# Patient Record
Sex: FEMALE | Race: OTHER | NOT HISPANIC OR LATINO | Employment: FULL TIME | URBAN - METROPOLITAN AREA
[De-identification: names, ages, dates, MRNs, and addresses within clinical notes are randomized per-mention and may not be internally consistent; named-entity substitution may affect disease eponyms.]

---

## 2023-02-20 LAB
ERYTHROCYTE DISTRIBUTION WIDTH (RATIO) BY AUTOMATED COUNT: 18.1 % (ref 11.5–14.5)
ERYTHROCYTE MEAN CORPUSCULAR HEMOGLOBIN CONCENTRATION (G/DL) BY AUTOMATED: 28.3 G/DL (ref 32–36)
ERYTHROCYTE MEAN CORPUSCULAR VOLUME (FL) BY AUTOMATED COUNT: 78 FL (ref 80–100)
ERYTHROCYTES (10*6/UL) IN BLOOD BY AUTOMATED COUNT: 4.46 X10E12/L (ref 4–5.2)
HEMATOCRIT (%) IN BLOOD BY AUTOMATED COUNT: 34.6 % (ref 36–46)
HEMOGLOBIN (G/DL) IN BLOOD: 9.8 G/DL (ref 12–16)
LEUKOCYTES (10*3/UL) IN BLOOD BY AUTOMATED COUNT: 7 X10E9/L (ref 4.4–11.3)
NRBC (PER 100 WBCS) BY AUTOMATED COUNT: 0 /100 WBC (ref 0–0)
PLATELETS (10*3/UL) IN BLOOD AUTOMATED COUNT: 562 X10E9/L (ref 150–450)

## 2023-02-21 LAB
ALANINE AMINOTRANSFERASE (SGPT) (U/L) IN SER/PLAS: 9 U/L (ref 7–45)
ALBUMIN (G/DL) IN SER/PLAS: 3.9 G/DL (ref 3.4–5)
ALKALINE PHOSPHATASE (U/L) IN SER/PLAS: 66 U/L (ref 33–110)
ANION GAP IN SER/PLAS: 13 MMOL/L (ref 10–20)
ASPARTATE AMINOTRANSFERASE (SGOT) (U/L) IN SER/PLAS: 10 U/L (ref 9–39)
BILIRUBIN TOTAL (MG/DL) IN SER/PLAS: 0.4 MG/DL (ref 0–1.2)
CALCIDIOL (25 OH VITAMIN D3) (NG/ML) IN SER/PLAS: 9 NG/ML
CALCIUM (MG/DL) IN SER/PLAS: 8.8 MG/DL (ref 8.6–10.6)
CARBON DIOXIDE, TOTAL (MMOL/L) IN SER/PLAS: 26 MMOL/L (ref 21–32)
CHLORIDE (MMOL/L) IN SER/PLAS: 103 MMOL/L (ref 98–107)
CHOLESTEROL (MG/DL) IN SER/PLAS: 152 MG/DL (ref 0–199)
CHOLESTEROL IN HDL (MG/DL) IN SER/PLAS: 69.4 MG/DL
CHOLESTEROL/HDL RATIO: 2.2
CREATININE (MG/DL) IN SER/PLAS: 0.63 MG/DL (ref 0.5–1.05)
GFR FEMALE: >90 ML/MIN/1.73M2
GLUCOSE (MG/DL) IN SER/PLAS: 91 MG/DL (ref 74–99)
LDL: 71 MG/DL (ref 0–99)
POTASSIUM (MMOL/L) IN SER/PLAS: 4.2 MMOL/L (ref 3.5–5.3)
PROTEIN TOTAL: 7.2 G/DL (ref 6.4–8.2)
SODIUM (MMOL/L) IN SER/PLAS: 138 MMOL/L (ref 136–145)
TRIGLYCERIDE (MG/DL) IN SER/PLAS: 57 MG/DL (ref 0–149)
UREA NITROGEN (MG/DL) IN SER/PLAS: 8 MG/DL (ref 6–23)
VLDL: 11 MG/DL (ref 0–40)

## 2023-05-02 ENCOUNTER — HOSPITAL ENCOUNTER (OUTPATIENT)
Dept: DATA CONVERSION | Facility: HOSPITAL | Age: 43
End: 2023-05-02
Attending: SURGERY | Admitting: SURGERY
Payer: COMMERCIAL

## 2023-05-02 DIAGNOSIS — E66.01 MORBID (SEVERE) OBESITY DUE TO EXCESS CALORIES (MULTI): ICD-10-CM

## 2023-05-02 DIAGNOSIS — K80.10 CALCULUS OF GALLBLADDER WITH CHRONIC CHOLECYSTITIS WITHOUT OBSTRUCTION: ICD-10-CM

## 2023-05-02 DIAGNOSIS — K80.50 CALCULUS OF BILE DUCT WITHOUT CHOLANGITIS OR CHOLECYSTITIS WITHOUT OBSTRUCTION: ICD-10-CM

## 2023-05-02 DIAGNOSIS — K21.9 GASTRO-ESOPHAGEAL REFLUX DISEASE WITHOUT ESOPHAGITIS: ICD-10-CM

## 2023-05-23 LAB
COMPLETE PATHOLOGY REPORT: NORMAL
CONVERTED CLINICAL DIAGNOSIS-HISTORY: NORMAL
CONVERTED FINAL DIAGNOSIS: NORMAL
CONVERTED FINAL REPORT PDF LINK TO COPY AND PASTE: NORMAL
CONVERTED GROSS DESCRIPTION: NORMAL

## 2023-06-28 LAB
CHLAMYDIA TRACH., AMPLIFIED: NEGATIVE
N. GONORRHEA, AMPLIFIED: NEGATIVE
TRICHOMONAS VAGINALIS: NEGATIVE

## 2023-09-14 NOTE — H&P
History & Physical Reviewed:   Pregnant/Lactating:  ·  Are You Pregnant no   ·  Are You Currently Breastfeeding no     I have reviewed the History and Physical dated:  20-Apr-2023   History and Physical reviewed and relevant findings noted. Patient examined to review pertinent physical  findings.: No significant changes   Home Medications Reviewed: no changes noted   Allergies Reviewed: no changes noted     Consent:   COVID-19 Consent:  ·  COVID-19 Risk Consent Surgeon has reviewed key risks related to the risk of royce COVID-19 and if they contract COVID-19 what the risks are.       Electronic Signatures:  Vladimir Parsons)  (Signed 02-May-2023 09:10)   Authored: History & Physical Reviewed, Consent, Note  Completion      Last Updated: 02-May-2023 09:10 by Vladimir Parsons)

## 2023-10-02 NOTE — OP NOTE
"      PROCEDURE DETAILS    Preoperative Diagnosis:  Cholelithiasis    Postoperative Diagnosis:  Cholelithiasis, chronic cholecystitis     Surgeon: Vladimir Parsons  Resident/Fellow/Other Assistant: Amelia Walls    Procedure:  1.  laparoscopic cholecystectomy      Anesthesia: General  Estimated Blood Loss: 5  Findings: signs of  acute inflammation  Patient Returned To/Condition: Stable to PACU         Operative Report:    Patient is a 42-year-old female with history of morbid obesity who is long known to  have gallstones. Since September 2022 she has had repeated attacks of some epigastric abdominal pain that radiates to the right sided back. Is associated with nausea and vomiting. Her symptoms are often made worse with meals. She did seek treatment in  emergency department where CT scan shows her to have a 2.4 cm gallstone lodged in the neck of the gallbladder.  She comes in for elective  laparoscopic cholecystectomy. We discussed the procedure to include risks, benefits and alternatives. She agrees  to the operation       DESCRIPTION OF PROCEDURE:    The patient was taken to the operating room, placed supine on the operating table.  Time-out was performed.  General anesthesia was induced.  She received antibiotics.  The abdomen was prepped and draped in a sterile fashion.  We made an infraumbilical  midline incision, placed a 12 mm Veena trocar.  We established insufflation. We placed a 5 mm subxiphoid port and then two 5 mm right subcostal ports.  We retracted the gallbladder up and over the right lobe of the liver.  There were attachments of the  omentum to the gallbladder suggesting chronic inflammation, these were taken down.  We then retracted the infundibulum laterally and inferiorly, and this nicely exposed the triangle of Calot.  We circumferentially dissected the cystic duct and cystic  artery free from surrounding structures,  exposing the cystic duct and cystic artery thus achieving the \"critical view.\"  " We doubly ligated the cystic artery using Hem-o-julio cesar clips and divided it. We then doubly ligated the cystic duct and divided it.   We then excised the gallbladder from the inferior aspect of the liver using the cautery.  Once excised, we removed the gallbladder specimen using an Endo Catch bag.  We re-established insufflation and after ensuring hemostasis, we removed our ports,  closed our midline fascial defect using 0 Vicryl suture placed in a figure of eight fashion.  We then closed the skin using 3-0 and 4-0 subcuticular Vicryl stitches followed by Dermabond glue.  The patient tolerated the procedure without difficulty and  was returned to the recovery room in stable condition.    Vladimir Parsons MD   Note Recipients:   Ernesto Torres MD Jasper, John, MD                          Electronic Signatures:  Vladimir Parsons)  (Signed 02-May-2023 11:00)   Authored: Post-Operative Note, Chart Review, Note Completion      Last Updated: 02-May-2023 11:00 by Vladimir Parsons)

## 2023-11-28 ENCOUNTER — OFFICE VISIT (OUTPATIENT)
Dept: PRIMARY CARE | Facility: CLINIC | Age: 43
End: 2023-11-28
Payer: COMMERCIAL

## 2023-11-28 VITALS
BODY MASS INDEX: 43.4 KG/M2 | HEART RATE: 97 BPM | SYSTOLIC BLOOD PRESSURE: 132 MMHG | WEIGHT: 293 LBS | HEIGHT: 69 IN | RESPIRATION RATE: 18 BRPM | DIASTOLIC BLOOD PRESSURE: 88 MMHG | TEMPERATURE: 98 F | OXYGEN SATURATION: 98 %

## 2023-11-28 DIAGNOSIS — D50.0 IRON DEFICIENCY ANEMIA DUE TO CHRONIC BLOOD LOSS: Primary | ICD-10-CM

## 2023-11-28 DIAGNOSIS — N92.0 MENORRHAGIA WITH REGULAR CYCLE: ICD-10-CM

## 2023-11-28 DIAGNOSIS — Z00.00 HEALTHCARE MAINTENANCE: ICD-10-CM

## 2023-11-28 DIAGNOSIS — G89.29 CHRONIC BACK PAIN, UNSPECIFIED BACK LOCATION, UNSPECIFIED BACK PAIN LATERALITY: ICD-10-CM

## 2023-11-28 DIAGNOSIS — M54.9 CHRONIC BACK PAIN, UNSPECIFIED BACK LOCATION, UNSPECIFIED BACK PAIN LATERALITY: ICD-10-CM

## 2023-11-28 LAB
ALBUMIN SERPL BCP-MCNC: 4.1 G/DL (ref 3.4–5)
ALP SERPL-CCNC: 69 U/L (ref 33–110)
ALT SERPL W P-5'-P-CCNC: 9 U/L (ref 7–45)
ANION GAP SERPL CALC-SCNC: 15 MMOL/L (ref 10–20)
AST SERPL W P-5'-P-CCNC: 9 U/L (ref 9–39)
BILIRUB SERPL-MCNC: 0.3 MG/DL (ref 0–1.2)
BUN SERPL-MCNC: 11 MG/DL (ref 6–23)
CALCIUM SERPL-MCNC: 9 MG/DL (ref 8.6–10.6)
CHLORIDE SERPL-SCNC: 104 MMOL/L (ref 98–107)
CHOLEST SERPL-MCNC: 146 MG/DL (ref 0–199)
CHOLESTEROL/HDL RATIO: 2.4
CO2 SERPL-SCNC: 23 MMOL/L (ref 21–32)
CREAT SERPL-MCNC: 0.75 MG/DL (ref 0.5–1.05)
ERYTHROCYTE [DISTWIDTH] IN BLOOD BY AUTOMATED COUNT: 17.9 % (ref 11.5–14.5)
GFR SERPL CREATININE-BSD FRML MDRD: >90 ML/MIN/1.73M*2
GLUCOSE SERPL-MCNC: 119 MG/DL (ref 74–99)
HCT VFR BLD AUTO: 36.2 % (ref 36–46)
HDLC SERPL-MCNC: 62.1 MG/DL
HGB BLD-MCNC: 10.5 G/DL (ref 12–16)
MCH RBC QN AUTO: 22.7 PG (ref 26–34)
MCHC RBC AUTO-ENTMCNC: 29 G/DL (ref 32–36)
MCV RBC AUTO: 78 FL (ref 80–100)
NON-HDL CHOLESTEROL: 84 MG/DL (ref 0–149)
NRBC BLD-RTO: 0 /100 WBCS (ref 0–0)
PLATELET # BLD AUTO: 548 X10*3/UL (ref 150–450)
POTASSIUM SERPL-SCNC: 4 MMOL/L (ref 3.5–5.3)
PROT SERPL-MCNC: 7 G/DL (ref 6.4–8.2)
RBC # BLD AUTO: 4.63 X10*6/UL (ref 4–5.2)
SODIUM SERPL-SCNC: 138 MMOL/L (ref 136–145)
WBC # BLD AUTO: 6.6 X10*3/UL (ref 4.4–11.3)

## 2023-11-28 PROCEDURE — 1036F TOBACCO NON-USER: CPT | Performed by: INTERNAL MEDICINE

## 2023-11-28 PROCEDURE — 85027 COMPLETE CBC AUTOMATED: CPT

## 2023-11-28 PROCEDURE — 36415 COLL VENOUS BLD VENIPUNCTURE: CPT

## 2023-11-28 PROCEDURE — 82465 ASSAY BLD/SERUM CHOLESTEROL: CPT

## 2023-11-28 PROCEDURE — 3008F BODY MASS INDEX DOCD: CPT | Performed by: INTERNAL MEDICINE

## 2023-11-28 PROCEDURE — 83718 ASSAY OF LIPOPROTEIN: CPT

## 2023-11-28 PROCEDURE — 80053 COMPREHEN METABOLIC PANEL: CPT

## 2023-11-28 PROCEDURE — 99213 OFFICE O/P EST LOW 20 MIN: CPT | Performed by: INTERNAL MEDICINE

## 2023-11-28 SDOH — ECONOMIC STABILITY: TRANSPORTATION INSECURITY
IN THE PAST 12 MONTHS, HAS LACK OF TRANSPORTATION KEPT YOU FROM MEETINGS, WORK, OR FROM GETTING THINGS NEEDED FOR DAILY LIVING?: NO

## 2023-11-28 SDOH — ECONOMIC STABILITY: HOUSING INSECURITY
IN THE LAST 12 MONTHS, WAS THERE A TIME WHEN YOU DID NOT HAVE A STEADY PLACE TO SLEEP OR SLEPT IN A SHELTER (INCLUDING NOW)?: NO

## 2023-11-28 SDOH — ECONOMIC STABILITY: TRANSPORTATION INSECURITY
IN THE PAST 12 MONTHS, HAS THE LACK OF TRANSPORTATION KEPT YOU FROM MEDICAL APPOINTMENTS OR FROM GETTING MEDICATIONS?: NO

## 2023-11-28 SDOH — ECONOMIC STABILITY: INCOME INSECURITY: IN THE LAST 12 MONTHS, WAS THERE A TIME WHEN YOU WERE NOT ABLE TO PAY THE MORTGAGE OR RENT ON TIME?: NO

## 2023-11-28 SDOH — ECONOMIC STABILITY: FOOD INSECURITY: WITHIN THE PAST 12 MONTHS, YOU WORRIED THAT YOUR FOOD WOULD RUN OUT BEFORE YOU GOT MONEY TO BUY MORE.: NEVER TRUE

## 2023-11-28 SDOH — ECONOMIC STABILITY: FOOD INSECURITY: WITHIN THE PAST 12 MONTHS, THE FOOD YOU BOUGHT JUST DIDN'T LAST AND YOU DIDN'T HAVE MONEY TO GET MORE.: NEVER TRUE

## 2023-11-28 SDOH — HEALTH STABILITY: PHYSICAL HEALTH: ON AVERAGE, HOW MANY DAYS PER WEEK DO YOU ENGAGE IN MODERATE TO STRENUOUS EXERCISE (LIKE A BRISK WALK)?: 6 DAYS

## 2023-11-28 SDOH — ECONOMIC STABILITY: GENERAL
WHICH OF THE FOLLOWING DO YOU KNOW HOW TO USE AND HAVE ACCESS TO EVERY DAY? (CHOOSE ALL THAT APPLY): DESKTOP COMPUTER, LAPTOP COMPUTER, OR TABLET WITH BROADBAND INTERNET CONNECTION;SMARTPHONE WITH CELLULAR DATA PLAN

## 2023-11-28 SDOH — HEALTH STABILITY: PHYSICAL HEALTH: ON AVERAGE, HOW MANY MINUTES DO YOU ENGAGE IN EXERCISE AT THIS LEVEL?: 60 MIN

## 2023-11-28 ASSESSMENT — ANXIETY QUESTIONNAIRES
IF YOU CHECKED OFF ANY PROBLEMS ON THIS QUESTIONNAIRE, HOW DIFFICULT HAVE THESE PROBLEMS MADE IT FOR YOU TO DO YOUR WORK, TAKE CARE OF THINGS AT HOME, OR GET ALONG WITH OTHER PEOPLE: NOT DIFFICULT AT ALL
2. NOT BEING ABLE TO STOP OR CONTROL WORRYING: NOT AT ALL
5. BEING SO RESTLESS THAT IT IS HARD TO SIT STILL: NOT AT ALL
GAD7 TOTAL SCORE: 0
1. FEELING NERVOUS, ANXIOUS, OR ON EDGE: NOT AT ALL
6. BECOMING EASILY ANNOYED OR IRRITABLE: NOT AT ALL
4. TROUBLE RELAXING: NOT AT ALL
3. WORRYING TOO MUCH ABOUT DIFFERENT THINGS: NOT AT ALL
7. FEELING AFRAID AS IF SOMETHING AWFUL MIGHT HAPPEN: NOT AT ALL

## 2023-11-28 ASSESSMENT — LIFESTYLE VARIABLES
SKIP TO QUESTIONS 9-10: 1
HOW MANY STANDARD DRINKS CONTAINING ALCOHOL DO YOU HAVE ON A TYPICAL DAY: 1 OR 2
HOW OFTEN DO YOU HAVE A DRINK CONTAINING ALCOHOL: MONTHLY OR LESS
AUDIT-C TOTAL SCORE: 1
HOW OFTEN DO YOU HAVE SIX OR MORE DRINKS ON ONE OCCASION: NEVER

## 2023-11-28 ASSESSMENT — ENCOUNTER SYMPTOMS
PALPITATIONS: 0
SHORTNESS OF BREATH: 0
CHEST TIGHTNESS: 0
AGITATION: 0
DIARRHEA: 0
LOSS OF SENSATION IN FEET: 0
CHILLS: 0
OCCASIONAL FEELINGS OF UNSTEADINESS: 0
DEPRESSION: 0
SORE THROAT: 0
NAUSEA: 0
ACTIVITY CHANGE: 0
SINUS PRESSURE: 0
MYALGIAS: 0
WEAKNESS: 0

## 2023-11-28 ASSESSMENT — COLUMBIA-SUICIDE SEVERITY RATING SCALE - C-SSRS
2. HAVE YOU ACTUALLY HAD ANY THOUGHTS OF KILLING YOURSELF?: NO
1. IN THE PAST MONTH, HAVE YOU WISHED YOU WERE DEAD OR WISHED YOU COULD GO TO SLEEP AND NOT WAKE UP?: NO
6. HAVE YOU EVER DONE ANYTHING, STARTED TO DO ANYTHING, OR PREPARED TO DO ANYTHING TO END YOUR LIFE?: NO

## 2023-11-28 ASSESSMENT — PATIENT HEALTH QUESTIONNAIRE - PHQ9
1. LITTLE INTEREST OR PLEASURE IN DOING THINGS: NOT AT ALL
2. FEELING DOWN, DEPRESSED OR HOPELESS: NOT AT ALL
SUM OF ALL RESPONSES TO PHQ9 QUESTIONS 1 & 2: 0

## 2023-11-28 ASSESSMENT — SOCIAL DETERMINANTS OF HEALTH (SDOH)
HOW HARD IS IT FOR YOU TO PAY FOR THE VERY BASICS LIKE FOOD, HOUSING, MEDICAL CARE, AND HEATING?: NOT HARD AT ALL
WITHIN THE LAST YEAR, HAVE TO BEEN RAPED OR FORCED TO HAVE ANY KIND OF SEXUAL ACTIVITY BY YOUR PARTNER OR EX-PARTNER?: NO
WITHIN THE LAST YEAR, HAVE YOU BEEN HUMILIATED OR EMOTIONALLY ABUSED IN OTHER WAYS BY YOUR PARTNER OR EX-PARTNER?: NO
IN A TYPICAL WEEK, HOW MANY TIMES DO YOU TALK ON THE PHONE WITH FAMILY, FRIENDS, OR NEIGHBORS?: MORE THAN THREE TIMES A WEEK
IN THE PAST 12 MONTHS, HAS THE ELECTRIC, GAS, OIL, OR WATER COMPANY THREATENED TO SHUT OFF SERVICE IN YOUR HOME?: NO
WITHIN THE LAST YEAR, HAVE YOU BEEN AFRAID OF YOUR PARTNER OR EX-PARTNER?: NO
HOW OFTEN DO YOU GET TOGETHER WITH FRIENDS OR RELATIVES?: MORE THAN THREE TIMES A WEEK
WITHIN THE LAST YEAR, HAVE YOU BEEN KICKED, HIT, SLAPPED, OR OTHERWISE PHYSICALLY HURT BY YOUR PARTNER OR EX-PARTNER?: NO
HOW OFTEN DO YOU ATTENT MEETINGS OF THE CLUB OR ORGANIZATION YOU BELONG TO?: PATIENT DECLINED
DO YOU BELONG TO ANY CLUBS OR ORGANIZATIONS SUCH AS CHURCH GROUPS UNIONS, FRATERNAL OR ATHLETIC GROUPS, OR SCHOOL GROUPS?: PATIENT DECLINED
ARE YOU MARRIED, WIDOWED, DIVORCED, SEPARATED, NEVER MARRIED, OR LIVING WITH A PARTNER?: PATIENT DECLINED
HOW OFTEN DO YOU ATTEND CHURCH OR RELIGIOUS SERVICES?: MORE THAN 4 TIMES PER YEAR

## 2023-11-28 ASSESSMENT — PAIN SCALES - GENERAL: PAINLEVEL: 0-NO PAIN

## 2023-11-28 NOTE — PROGRESS NOTES
"Subjective   Patient ID: Viktoria Rodriguez is a 42 y.o. female who presents for referral/ breast reduction. She has wanted one since age 19.  She has back problems with frequent exacerbations at least monthly.  Also with shoulder pain with deformities of both shoulders. She is also wondering about taking ozempic in January 2024 (she called and they bsaid it would be covered).    HPI     Review of Systems   Constitutional:  Negative for activity change and chills.   HENT:  Negative for congestion, sinus pressure and sore throat.    Respiratory:  Negative for chest tightness and shortness of breath.    Cardiovascular:  Negative for chest pain and palpitations.   Gastrointestinal:  Negative for diarrhea and nausea.   Musculoskeletal:  Negative for myalgias.   Neurological:  Negative for weakness.   Psychiatric/Behavioral:  Negative for agitation and behavioral problems.        Objective   /88 (BP Location: Left arm, Patient Position: Sitting, BP Cuff Size: Large adult)   Pulse 97   Temp 36.7 °C (98 °F) (Temporal)   Resp 18   Ht 1.74 m (5' 8.5\")   Wt (!) 155 kg (341 lb)   SpO2 98%   BMI 51.09 kg/m²     Physical Exam  Constitutional:       Appearance: Normal appearance.   HENT:      Head: Normocephalic and atraumatic.      Nose: Nose normal.      Mouth/Throat:      Mouth: Mucous membranes are moist.   Eyes:      Extraocular Movements: Extraocular movements intact.      Pupils: Pupils are equal, round, and reactive to light.   Cardiovascular:      Rate and Rhythm: Normal rate and regular rhythm.   Pulmonary:      Effort: No respiratory distress.      Breath sounds: No wheezing.   Abdominal:      General: Bowel sounds are normal.      Palpations: Abdomen is soft.   Neurological:      General: No focal deficit present.         Assessment/Plan   Problem List Items Addressed This Visit             ICD-10-CM    Menorrhagia with regular cycle N92.0    Relevant Orders    CBC    Chronic back pain M54.9, G89.29    " Relevant Orders    Referral to Breast Surgery    Iron deficiency anemia due to chronic blood loss - Primary D50.0    Relevant Orders    CBC     Other Visit Diagnoses         Codes    Healthcare maintenance     Z00.00    Relevant Orders    Comprehensive Metabolic Panel    Lipid Panel Non-Fasting        I will refer patient to breast surgeon for evaluation for a reduction.  We discussed her need to take iron and vitamin D as prescribed in the past, and she will resume taking that.  Will check screening labs.

## 2024-02-28 PROBLEM — R11.2 NAUSEA & VOMITING: Status: ACTIVE | Noted: 2024-02-28

## 2024-02-28 PROBLEM — E55.9 HYPOVITAMINOSIS D: Status: ACTIVE | Noted: 2024-02-28

## 2024-02-28 PROBLEM — D50.9 IRON DEFICIENCY ANEMIA: Status: ACTIVE | Noted: 2020-03-19

## 2024-02-28 PROBLEM — N94.6 DYSMENORRHEA: Status: ACTIVE | Noted: 2020-05-18

## 2024-02-28 PROBLEM — D50.0 ANEMIA DUE TO BLOOD LOSS: Status: ACTIVE | Noted: 2024-02-28

## 2024-02-28 PROBLEM — R50.9 FEVER: Status: ACTIVE | Noted: 2024-02-28

## 2024-02-28 PROBLEM — R10.9 ABDOMINAL DISCOMFORT: Status: ACTIVE | Noted: 2024-02-28

## 2024-02-28 PROBLEM — E66.01 MORBID OBESITY (MULTI): Chronic | Status: ACTIVE | Noted: 2020-03-18

## 2024-02-28 PROBLEM — R05.9 COUGH: Status: ACTIVE | Noted: 2024-02-28

## 2024-02-28 PROBLEM — K80.50 BILIARY COLIC: Status: ACTIVE | Noted: 2024-02-28

## 2024-02-28 PROBLEM — R41.840 ATTENTION DEFICIT: Status: ACTIVE | Noted: 2024-02-28

## 2024-02-28 PROBLEM — N93.9 ABNORMAL UTERINE BLEEDING (AUB): Status: ACTIVE | Noted: 2024-02-28

## 2024-02-28 PROBLEM — R73.03 PREDIABETES: Status: ACTIVE | Noted: 2020-03-19

## 2024-02-28 PROBLEM — K80.50 GALL BLADDER PAIN: Status: ACTIVE | Noted: 2024-02-28

## 2024-02-28 PROBLEM — K80.20 CHOLELITHIASIS WITHOUT OBSTRUCTION: Status: ACTIVE | Noted: 2024-02-28

## 2024-02-29 ENCOUNTER — OFFICE VISIT (OUTPATIENT)
Dept: PRIMARY CARE | Facility: CLINIC | Age: 44
End: 2024-02-29
Payer: COMMERCIAL

## 2024-02-29 VITALS
DIASTOLIC BLOOD PRESSURE: 86 MMHG | RESPIRATION RATE: 18 BRPM | SYSTOLIC BLOOD PRESSURE: 136 MMHG | WEIGHT: 293 LBS | HEART RATE: 90 BPM | TEMPERATURE: 97.6 F | HEIGHT: 69 IN | BODY MASS INDEX: 43.4 KG/M2 | OXYGEN SATURATION: 98 %

## 2024-02-29 DIAGNOSIS — F51.01 PRIMARY INSOMNIA: ICD-10-CM

## 2024-02-29 DIAGNOSIS — E55.9 HYPOVITAMINOSIS D: ICD-10-CM

## 2024-02-29 DIAGNOSIS — M54.50 CHRONIC MIDLINE LOW BACK PAIN WITHOUT SCIATICA: Primary | ICD-10-CM

## 2024-02-29 DIAGNOSIS — Z11.4 SCREENING FOR HIV (HUMAN IMMUNODEFICIENCY VIRUS): ICD-10-CM

## 2024-02-29 DIAGNOSIS — L91.8 SKIN TAG: ICD-10-CM

## 2024-02-29 DIAGNOSIS — Z11.59 NEED FOR HEPATITIS C SCREENING TEST: ICD-10-CM

## 2024-02-29 DIAGNOSIS — G89.29 CHRONIC MIDLINE LOW BACK PAIN WITHOUT SCIATICA: Primary | ICD-10-CM

## 2024-02-29 DIAGNOSIS — N92.0 MENORRHAGIA WITH REGULAR CYCLE: ICD-10-CM

## 2024-02-29 DIAGNOSIS — E66.01 MORBID OBESITY (MULTI): Chronic | ICD-10-CM

## 2024-02-29 DIAGNOSIS — Z23 NEED FOR VACCINATION: ICD-10-CM

## 2024-02-29 LAB
25(OH)D3 SERPL-MCNC: 20 NG/ML (ref 30–100)
EST. AVERAGE GLUCOSE BLD GHB EST-MCNC: 120 MG/DL
HBA1C MFR BLD: 5.8 %
HCV AB SER QL: NONREACTIVE
HIV 1+2 AB+HIV1 P24 AG SERPL QL IA: NONREACTIVE

## 2024-02-29 PROCEDURE — 99396 PREV VISIT EST AGE 40-64: CPT | Performed by: INTERNAL MEDICINE

## 2024-02-29 PROCEDURE — 83036 HEMOGLOBIN GLYCOSYLATED A1C: CPT

## 2024-02-29 PROCEDURE — 1036F TOBACCO NON-USER: CPT | Performed by: INTERNAL MEDICINE

## 2024-02-29 PROCEDURE — 87389 HIV-1 AG W/HIV-1&-2 AB AG IA: CPT

## 2024-02-29 PROCEDURE — 36415 COLL VENOUS BLD VENIPUNCTURE: CPT

## 2024-02-29 PROCEDURE — 86803 HEPATITIS C AB TEST: CPT

## 2024-02-29 PROCEDURE — 99213 OFFICE O/P EST LOW 20 MIN: CPT | Performed by: INTERNAL MEDICINE

## 2024-02-29 PROCEDURE — 3008F BODY MASS INDEX DOCD: CPT | Performed by: INTERNAL MEDICINE

## 2024-02-29 PROCEDURE — 82306 VITAMIN D 25 HYDROXY: CPT

## 2024-02-29 RX ORDER — CYCLOBENZAPRINE HCL 10 MG
10 TABLET ORAL 2 TIMES DAILY PRN
COMMUNITY
Start: 2024-01-08 | End: 2024-02-29 | Stop reason: WASHOUT

## 2024-02-29 ASSESSMENT — ENCOUNTER SYMPTOMS
PALPITATIONS: 0
SHORTNESS OF BREATH: 0
ACTIVITY CHANGE: 0
DIARRHEA: 0
CHEST TIGHTNESS: 0
BACK PAIN: 1
SORE THROAT: 0
WEAKNESS: 0
MYALGIAS: 0
NAUSEA: 0
CHILLS: 0
SINUS PRESSURE: 0
AGITATION: 0

## 2024-02-29 ASSESSMENT — PAIN SCALES - GENERAL: PAINLEVEL: 7

## 2024-02-29 NOTE — PROGRESS NOTES
Primary care office Note- Dr. Ernesto Torres      Name: Viktoria Rodriguez, Age: 43 y.o., Gender: female, MRN: [id number]   Pharmacy:   [id number] [id number]  [address] [facility] [address]  Phone: [phone] Fax: [phone]     PCP: Ernesto Torres        Subjective:     Chief Complaint   Patient presents with    Annual Exam       HPI:   Viktoria Rodriguez is a 43 y.o. female that presents for annual exam.  Her family is good. Lives at home with [facility]. 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  Also with a large skin tag on the right side of the back of her thigh.    ROS  Review of Systems   Constitutional:  Negative for activity change and chills.   HENT:  Negative for congestion, sinus pressure and sore throat.    Respiratory:  Negative for chest tightness and shortness of breath.    Cardiovascular:  Negative for chest pain and palpitations.   Gastrointestinal:  Negative for diarrhea and nausea.   Musculoskeletal:  Positive for back pain. Negative for myalgias.   Neurological:  Negative for weakness.   Psychiatric/Behavioral:  Negative for agitation and behavioral problems.         Medical History      PMH:    has no past medical history on file.   Allergies:   Allergies   Allergen Reactions    Cabbage Itching and Swelling      Surgical Hx:   History reviewed. No pertinent surgical history.   Social HX:   Social History     Tobacco Use    Smoking status: Never    Smokeless tobacco: Never   Vaping Use    Vaping Use: Never used   Substance Use Topics    Alcohol use: Yes     Alcohol/week: 4.0 standard drinks of alcohol     Types: 4 Glasses of wine per week    Drug use: Yes     Types: Marijuana        MEDS:   Current Outpatient Medications   Medication Instructions    tetanus-diphtheria toxoids-Td (Tenivac 2-2) injection 0.5 mL, intramuscular, Once        Objective Data     Objective:   Visit Vitals  /86 (BP Location: Left arm, Patient Position: Sitting, BP Cuff Size:  Large adult)   Pulse 90   Temp 36.4 °C (97.6 °F) (Temporal)   Resp 18        Physical Examination:   Physical Exam  Constitutional:       Appearance: Normal appearance.   HENT:      Head: Normocephalic and atraumatic.      Nose: Nose normal.      Mouth/Throat:      Mouth: Mucous membranes are moist.   Eyes:      Extraocular Movements: Extraocular movements intact.      Pupils: Pupils are equal, round, and reactive to light.   Cardiovascular:      Rate and Rhythm: Normal rate and regular rhythm.   Pulmonary:      Effort: No respiratory distress.      Breath sounds: No wheezing.   Abdominal:      General: Bowel sounds are normal.      Palpations: Abdomen is soft.   Skin:     Comments: Large 2.5cm skin tag that is benign in character   Neurological:      General: No focal deficit present.          Last Labs:   CBC:   WBC   Date Value Ref Range Status   11/28/2023 6.6 4.4 - 11.3 x10*3/uL Final   02/20/2023 7.0 4.4 - 11.3 x10E9/L Final   09/01/2022 10.4 4.5 - 11.0 K/UL Final     Hemoglobin   Date Value Ref Range Status   11/28/2023 10.5 (L) 12.0 - 16.0 g/dL Final   02/20/2023 9.8 (L) 12.0 - 16.0 g/dL Final   09/01/2022 10.5 (L) 12.0 - 15.0 GM/DL Final     MCV   Date Value Ref Range Status   11/28/2023 78 (L) 80 - 100 fL Final   02/20/2023 78 (L) 80 - 100 fL Final   09/01/2022 75.8 (L) 80 - 100 FL Final     Platelets   Date Value Ref Range Status   11/28/2023 548 (H) 150 - 450 x10*3/uL Final   02/20/2023 562 (H) 150 - 450 x10E9/L Final   09/01/2022 538 (H) 150 - 450 K/UL Final      CMP:   Sodium   Date Value Ref Range Status   11/28/2023 138 136 - 145 mmol/L Final   02/20/2023 138 136 - 145 mmol/L Final   09/01/2022 135 133 - 145 MMOL/L Final     Potassium   Date Value Ref Range Status   11/28/2023 4.0 3.5 - 5.3 mmol/L Final   02/20/2023 4.2 3.5 - 5.3 mmol/L Final   09/01/2022 4.0 3.4 - 5.1 MMOL/L Final     Chloride   Date Value Ref Range Status   11/28/2023 104 98 - 107 mmol/L Final   02/20/2023 103 98 - 107 mmol/L Final  "  09/01/2022 100 97 - 107 MMOL/L Final     Urea Nitrogen   Date Value Ref Range Status   11/28/2023 11 6 - 23 mg/dL Final   02/20/2023 8 6 - 23 mg/dL Final   09/01/2022 7 (L) 8 - 25 MG/DL Final     Creatinine   Date Value Ref Range Status   11/28/2023 0.75 0.50 - 1.05 mg/dL Final   02/20/2023 0.63 0.50 - 1.05 mg/dL Final   09/01/2022 0.7 0.4 - 1.6 MG/DL Final     eGFR   Date Value Ref Range Status   11/28/2023 >90 >60 mL/min/1.73m*2 Final     Comment:     Calculations of estimated GFR are performed using the 2021 CKD-EPI Study Refit equation without the race variable for the IDMS-Traceable creatinine methods.  https://jasn.asnjournals.org/content/early/2021/09/22/ASN.8834697303     ESTIMATED GFR   Date Value Ref Range Status   09/01/2022 112 mL/min/1.73 m2 Final     Comment:     CALCULATIONS OF ESTIMATED GFR ARE PERFORMED USING THE 2021 CKD-EPI   STUDY REFIT EQUATION WITHOUT THE RACE VARIABLE FOR THE IDMS-TRACEABLE   CREATININE METHODS.  https://jasn.asnjournals.org/content/early/2021/09/22/ASN.9960845401  Performed at [address] [address]     12/21/2021 85 mL/min/1.73 m2 Final     Comment:     GFR ml/min/1.73m2   Stage  ------------------   -----     90               1     60-89            2     30-59            3     15-29            4     <15              5  For -Americans, multiply EGFR result by 1.210  Calculation not validated for patients under 18 years of age.  Performed at [address] [address]        A1c:   No results found for: \"HGBA1C\"     Other labs;   Vit D:   Vitamin D, 25-Hydroxy   Date Value Ref Range Status   02/20/2023 9 (A) ng/mL Final     Comment:     .  DEFICIENCY:         < 20   NG/ML  INSUFFICIENCY:      20-29  NG/ML  SUFFICIENCY:         NG/ML    THIS ASSAY ACCURATELY QUANTIFIES THE SUM OF  VITAMIN D3, 25-HYDROXY AND VIT D2,25-HYDROXY.        TSH:  No results found for: \"TSH\"     Assessment and Plan     Problem List Items Addressed " This Visit       Menorrhagia with regular cycle    Relevant Orders    CBC    Chronic back pain - Primary    Relevant Orders    Referral to Plastic Surgery    Referral to Physical Therapy    Hypovitaminosis D    Relevant Orders    Vitamin D 25-Hydroxy,Total (for eval of Vitamin D levels)    Morbid obesity (CMS/HCC) (Chronic)    Relevant Orders    Hemoglobin A1C    Referral to Nutrition Services     Other Visit Diagnoses       Primary insomnia        Screening for HIV (human immunodeficiency virus)        Relevant Orders    HIV 1/2 Antigen/Antibody Screen with Reflex to Confirmation    Need for hepatitis C screening test        Relevant Orders    Hepatitis C Antibody    Skin tag        Relevant Orders    Referral to Dermatology    Need for vaccination        Relevant Medications    tetanus-diphtheria toxoids-Td (Tenivac 2-2) injection        I will refer her to dermatology for evaluation and removal of that large skin tag.  We will also refer her to nutrition services in preparation for her to see weight management.  In an attempt to address her chronic back pain, she agrees to physical therapy.  Otherwise, we will address her care gaps.    Ernesto Torres MD

## 2024-03-05 ENCOUNTER — APPOINTMENT (OUTPATIENT)
Dept: PRIMARY CARE | Facility: CLINIC | Age: 44
End: 2024-03-05
Payer: COMMERCIAL

## 2024-03-20 ENCOUNTER — NUTRITION (OUTPATIENT)
Dept: NUTRITION | Facility: HOSPITAL | Age: 44
End: 2024-03-20
Payer: COMMERCIAL

## 2024-03-20 VITALS — BODY MASS INDEX: 43.4 KG/M2 | HEIGHT: 69 IN | WEIGHT: 293 LBS

## 2024-03-20 DIAGNOSIS — E66.01 MORBID OBESITY (MULTI): Chronic | ICD-10-CM

## 2024-03-20 PROCEDURE — 97802 MEDICAL NUTRITION INDIV IN: CPT | Performed by: INTERNAL MEDICINE

## 2024-03-20 NOTE — PATIENT INSTRUCTIONS
1) Eat 3 consistent meals per day with 1-2 snacks in between, aim for 1800 kcals   2) Make sure protein is included in every meal and snack   3) Increase water intake to 64 ounces   4) Limit added sugars to less than 25g per day, decrease soda consumption   5) Use Plate method to build meals: 1/2 nonstarchy vegetables, 1/4 starch, 1/4 protein   6) Increase physical activity   7) Try to pack lunched more often, plan meals for the week

## 2024-03-20 NOTE — LETTER
"March 20, 2024     Ernesto Torres MD  [id number]  [facility] [address]    Patient: Viktoria Rodriguez   YOB: 1980   Date of Visit: 3/20/2024       Dear Dr. Ernesto Torres MD:    Thank you for referring Viktoria Rodriguez to me for evaluation. Below are my notes for this consultation.  If you have questions, please do not hesitate to call me. I look forward to following your patient along with you.       Sincerely,     Jeanne Samuels RD, LD      CC: No Recipients  ______________________________________________________________________________________    Nutrition Assessment    Reason for Visit:  Viktoria Rodriguez is a 43 y.o. female who presents for weight reduction.     Anthropometrics:  Anthropometrics  Height: 174 cm (5' 8.5\")  Weight: (!) 153 kg (338 lb)  BMI (Calculated): 50.64  IBW/kg (Dietitian Calculated): 65.9 kg  Percent of IBW: 233 %         Food And Nutrient Intake:  Food and Nutrient History  Food and Nutrient History: Grew up athletic, recently tried semaglutide medication and lost 15 lbs. Potentially dealing with PCOS and would like to lose weight. 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 Stops at fast food due to convenience and potentially packs a lunch 1-2 times a week.  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 packs a lunch maybe 1-2 a week       Micronutrient Intake  Vitamin Intake: D  Mineral/Element Intake: Iron       Physical Activities:  Physical Activity  Bed/Chair Ridden: No  Physical " "Activity History: Walks dog. Has [employer] membership but does not go. Swims in the summer.  Consistency: No         Nutrition Focused Physical Exam:  Subcutaneous Fat Loss  Orbital Fat Pads: Defer (no signs of malnutrition)        Energy Needs  Calculated Energy Needs Using Equations  Height: 174 cm (5' 8.5\")  Estimated Energy Needs  Total Energy Estimated Needs (kCal): 1800 kCal  Method for Estimating Needs: MSJ, -500kcals to promote 1-2lb wt loss per week  Estimated Fluid Needs  Method for Estimating Needs: 64 ounces or 1ml/kcal        Nutrition Diagnosis     Nutrition Diagnosis  Patient has Nutrition Diagnosis: Yes  Diagnosis Status (1): New  Nutrition Diagnosis 1: Obese  Related to (1): excessive energy intake  As Evidenced by (1): high consumption of energy dense foods and drinks, sedentary lifestyle, fast food consumption, BMI 50.6    Nutrition Interventions/Recommendations  Nutrition Prescription  Individualized Nutrition Prescription Provided for : 1800kcal diet  Food and Nutrition Delivery  Meals & Snacks: Energy-modified diet  Nutrition Education  Nutrition Education Content: Content related nutrition education (1800kcal 5 day meal plan, plate method, planning meals, wt loss tips, exercise)  Goals: Pt to comply with both verbal and written education provided in the one-one-one setting.  Nutrition Counseling  Goals: 1) Eat 3 consistent meals per day with 1-2 snacks in between, aim for 1800 kcals  2) Make sure protein is included in every meal and snack 3) Increase water intake to 64 ounces 4) Limit added sugars to less than 25g per day, decrease soda consumption  5) Use Plate method to build meals: 1/2 nonstarchy vegetables, 1/4 starch, 1/4 protein 6) Increase physical activity 7) Try to pack lunched more often, plan meals for the week        Patient Instructions   1) Eat 3 consistent meals per day with 1-2 snacks in between, aim for 1800 kcals   2) Make sure protein is included in every meal and snack   3) " Increase water intake to 64 ounces   4) Limit added sugars to less than 25g per day, decrease soda consumption   5) Use Plate method to build meals: 1/2 nonstarchy vegetables, 1/4 starch, 1/4 protein   6) Increase physical activity   7) Try to pack lunched more often, plan meals for the week     Nutrition Monitoring and Evaluation  Food/Nutrient Related History Monitoring  Monitoring and Evaluation Plan: Energy intake, Fluid intake, Amount of food, Meal/snack pattern  Energy Intake: Estimated energy intake  Criteria: Intake vs Recommendations  Fluid Intake: Estimated fluid intake  Criteria: Intake vs Recommendations  Amount of Food: Estimated amout of food, Types of food  Criteria: Intake vs Recommendations  Meal/Snack Pattern: Estimated meal and snack pattern  Criteria: Intake vs Recommendations  Body Composition/Growth/Weight History  Monitoring and Evaluation Plan: Weight  Weight: Measured weight  Criteria: Recent wt vs future weights          Time Spent  Time spent directly with patient, family or caregiver: 30 minutes        Readiness to Change : Good  Level of Understanding : Good  Anticipated Compliant : Good

## 2024-03-20 NOTE — PROGRESS NOTES
"Nutrition Assessment     Reason for Visit:  Viktoria Rodriguez is a 43 y.o. female who presents for weight reduction.     Anthropometrics:  Anthropometrics  Height: 174 cm (5' 8.5\")  Weight: (!) 153 kg (338 lb)  BMI (Calculated): 50.64  IBW/kg (Dietitian Calculated): 65.9 kg  Percent of IBW: 233 %         Food And Nutrient Intake:  Food and Nutrient History  Food and Nutrient History: Grew up athletic, recently tried semaglutide medication and lost 15 lbs. Potentially dealing with PCOS and would like to lose weight. 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 Stops at fast food due to convenience and potentially packs a lunch 1-2 times a week.  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 packs a lunch maybe 1-2 a week       Micronutrient Intake  Vitamin Intake: D  Mineral/Element Intake: Iron       Physical Activities:  Physical Activity  Bed/Chair Ridden: No  Physical Activity History: Walks dog. Has  membership but does not go. Swims in the summer.  Consistency: No         Nutrition Focused Physical Exam:  Subcutaneous Fat Loss  Orbital Fat Pads: Defer (no signs of malnutrition)        Energy Needs  Calculated Energy Needs Using Equations  Height: 174 cm (5' 8.5\")  Estimated Energy Needs  Total Energy Estimated Needs (kCal): 1800 kCal  Method for Estimating Needs: MSJ, -500kcals to promote 1-2lb wt loss per week  Estimated Fluid Needs  Method for Estimating Needs: 64 ounces or 1ml/kcal        Nutrition Diagnosis      Nutrition " Diagnosis  Patient has Nutrition Diagnosis: Yes  Diagnosis Status (1): New  Nutrition Diagnosis 1: Obese  Related to (1): excessive energy intake  As Evidenced by (1): high consumption of energy dense foods and drinks, sedentary lifestyle, fast food consumption, BMI 50.6    Nutrition Interventions/Recommendations   Nutrition Prescription  Individualized Nutrition Prescription Provided for : 1800kcal diet  Food and Nutrition Delivery  Meals & Snacks: Energy-modified diet  Nutrition Education  Nutrition Education Content: Content related nutrition education (1800kcal 5 day meal plan, plate method, planning meals, wt loss tips, exercise)  Goals: Pt to comply with both verbal and written education provided in the one-one-one setting.  Nutrition Counseling  Goals: 1) Eat 3 consistent meals per day with 1-2 snacks in between, aim for 1800 kcals  2) Make sure protein is included in every meal and snack 3) Increase water intake to 64 ounces 4) Limit added sugars to less than 25g per day, decrease soda consumption  5) Use Plate method to build meals: 1/2 nonstarchy vegetables, 1/4 starch, 1/4 protein 6) Increase physical activity 7) Try to pack lunched more often, plan meals for the week        Patient Instructions   1) Eat 3 consistent meals per day with 1-2 snacks in between, aim for 1800 kcals   2) Make sure protein is included in every meal and snack   3) Increase water intake to 64 ounces   4) Limit added sugars to less than 25g per day, decrease soda consumption   5) Use Plate method to build meals: 1/2 nonstarchy vegetables, 1/4 starch, 1/4 protein   6) Increase physical activity   7) Try to pack lunched more often, plan meals for the week     Nutrition Monitoring and Evaluation   Food/Nutrient Related History Monitoring  Monitoring and Evaluation Plan: Energy intake, Fluid intake, Amount of food, Meal/snack pattern  Energy Intake: Estimated energy intake  Criteria: Intake vs Recommendations  Fluid Intake: Estimated  fluid intake  Criteria: Intake vs Recommendations  Amount of Food: Estimated amout of food, Types of food  Criteria: Intake vs Recommendations  Meal/Snack Pattern: Estimated meal and snack pattern  Criteria: Intake vs Recommendations  Body Composition/Growth/Weight History  Monitoring and Evaluation Plan: Weight  Weight: Measured weight  Criteria: Recent wt vs future weights          Time Spent  Time spent directly with patient, family or caregiver: 30 minutes        Readiness to Change : Good  Level of Understanding : Good  Anticipated Compliant : Good

## 2024-03-27 ENCOUNTER — EVALUATION (OUTPATIENT)
Dept: PHYSICAL THERAPY | Facility: CLINIC | Age: 44
End: 2024-03-27
Payer: COMMERCIAL

## 2024-03-27 DIAGNOSIS — M54.50 CHRONIC MIDLINE LOW BACK PAIN WITHOUT SCIATICA: Primary | ICD-10-CM

## 2024-03-27 DIAGNOSIS — M25.551 RIGHT HIP PAIN: ICD-10-CM

## 2024-03-27 DIAGNOSIS — G89.29 CHRONIC MIDLINE LOW BACK PAIN WITHOUT SCIATICA: Primary | ICD-10-CM

## 2024-03-27 PROCEDURE — 97161 PT EVAL LOW COMPLEX 20 MIN: CPT | Mod: GP | Performed by: PHYSICAL THERAPIST

## 2024-03-27 PROCEDURE — 97110 THERAPEUTIC EXERCISES: CPT | Mod: GP | Performed by: PHYSICAL THERAPIST

## 2024-03-27 NOTE — PROGRESS NOTES
Physical Therapy    Physical Therapy Evaluation and Treatment      Patient Name: Viktoria Rodriguez  MRN: [id number]  Today's Date: 3/28/2024     PT Evaluation Time Entry  PT Evaluation (Low) Time Entry: 25  PT Therapeutic Procedures Time Entry  Therapeutic Exercise Time Entry: 10       Visit: 1    Assessment:  Pt presents to clinic with chief complaint of low back pain and R hip pain. Pt states LBP has been chronic in nature since she was younger. R hip recently began to bother her about a month ago. She demonstrates mild postural impairments, decreased lumbar AROM, and decreased LE strength. Pt will benefit from skilled therapy to address current impairments for improved ability to return to regular activities with reduced pain.     Plan:  OP PT Plan  Treatment/Interventions: Cryotherapy, Education/ Instruction, Electrical stimulation, Hot pack, Gait training, Manual therapy, Neuromuscular re-education, Self care/ home management, Therapeutic activities, Therapeutic exercises  PT Plan: Skilled PT  PT Frequency: 1 time per week  Duration: 4 weeks  Rehab Potential: Good  Plan of Care Agreement: Patient    Current Problem:   1. Chronic midline low back pain without sciatica  Referral to Physical Therapy      2. Right hip pain            Subjective    General:  General  Reason for Referral: Low back pain  Referred By: Dr. Ernesto Torres  Preferred Learning Style: verbal, visual, written  Precautions:  Precautions  STEADI Fall Risk Score (The score of 4 or more indicates an increased risk of falling): 0  Precautions Comment: None         Chief complaints: Pt presents to clinic with chief complaint of low back pain as well as R hip pain  Pt does feel that a lot of her low back pain is secondary to macromastia; she does have a referral to follow up with plastic surgery as well   Onset/Surgery Date: acute on chronic; long period of time   Mechanism of Injury: No specific STELLA  Previous History: Yes     Pain: 6/10  Hip is more of a  "constant 6/10  LBP will get down to 0/10      Location: low back, R hip pain    Type: spasm into the low back     Aggravators: no specific aggravators for the low back; any type of movement will aggravate R hip     Alleviators: butterfly stretch, heat, ibuprofen PRN, tylenol PRN for pain symptoms      Function:    Prior Level: Fully functional     Current limitations: standing for long periods of time, walking long periods of time    Condition: Worsening       Sleep:     Disturbed: Yes    Both the low back, R hip    Difficulty getting comfortable and waking up during the night     Preferred position(s):       Goals for Therapy:    \"Figure out way to make things more comfortable\"   \"Hoping with breast reduction surgery it will help the low back\"    Objective      Observation/Posture:    Mild tenderness to palpation bilat PSIS    Increased lumbar lordosis in standing   Macromastia noted    Mild rounded shoulder posturing   Valgus bilat knees       ROM/Flexibility:    Lumbar  Flexion: 80 deg, pain low back       Extension: 10 deg, pain       Sidebend R / L: 25 deg / 20 deg, pain L      Rotation R / L: 100% / 75%, tightness with L rotation       Hip R / L Flexion: WNL      Ext Rot: mild decrease bilat      Int Rot: moderate decrease bilat     Strength R / L:     Hip Flexion:     4+ / 5; R hip pain     Hip Abduction:    5 / 5    Hip Adduction:    5 / 5    Hip ER:      5 / 5    Hip IR:       5 / 5      Knee Extension:   5 / 5    Knee Flexion:       4- /4+     Ankle DF:             5 / 5    Ankle PF:              5 / 5     Neurological: Sensation is intact and symmetrical in BLEs.      Gait: no significant gait deficits      Special Tests:     Slump R / L : - / -     SLR R / L : - / -     Long sit: WNL     Outcome Measure:    KIM: 16 / 50 = 32 % impaired        TREATMENT  Therapeutic exercise:  PPT x 10   Figure 4 stretch x 1 min hold ea   Clamshells x 10 ea   Seated lumbar flexion stretch x 10 with 3 hold    "     Goals:  Active       PT Problem       Pt will report 50% reduction in low back pain and hip pain for improved ability to perform daily activities        Start:  03/28/24    Expected End:  06/25/24            Pt will demonstrate full and pain-free lumbar AROM for improved ability to perform lifting activities and transfers        Start:  03/28/24    Expected End:  06/25/24            Pt will increase LE strength by 1 MMT grade for all weakened muscle groups for improved ability to perform lifting activities        Start:  03/28/24    Expected End:  06/25/24            Pt will reduce KIM score by 10% for improved ability to return to regular activity with reduced pain       Start:  03/28/24    Expected End:  06/25/24

## 2024-04-10 ENCOUNTER — DOCUMENTATION (OUTPATIENT)
Dept: PHYSICAL THERAPY | Facility: CLINIC | Age: 44
End: 2024-04-10
Payer: COMMERCIAL

## 2024-04-10 NOTE — PROGRESS NOTES
Physical Therapy                 Therapy Communication Note    Patient Name: Viktoria Rodriguez  MRN: [id number]  Today's Date: 4/10/2024     Discipline: Physical Therapy    Missed Visit Reason:      Missed Time: No Show    Comment:

## 2024-04-15 ENCOUNTER — OFFICE VISIT (OUTPATIENT)
Dept: PLASTIC SURGERY | Facility: CLINIC | Age: 44
End: 2024-04-15
Payer: COMMERCIAL

## 2024-04-15 VITALS
SYSTOLIC BLOOD PRESSURE: 138 MMHG | HEIGHT: 69 IN | BODY MASS INDEX: 43.4 KG/M2 | HEART RATE: 85 BPM | DIASTOLIC BLOOD PRESSURE: 83 MMHG | WEIGHT: 293 LBS

## 2024-04-15 DIAGNOSIS — G89.29 CHRONIC MIDLINE LOW BACK PAIN WITHOUT SCIATICA: ICD-10-CM

## 2024-04-15 DIAGNOSIS — N62 MACROMASTIA: Primary | ICD-10-CM

## 2024-04-15 DIAGNOSIS — M54.50 CHRONIC MIDLINE LOW BACK PAIN WITHOUT SCIATICA: ICD-10-CM

## 2024-04-15 PROCEDURE — 99204 OFFICE O/P NEW MOD 45 MIN: CPT | Performed by: PHYSICIAN ASSISTANT

## 2024-04-15 PROCEDURE — 3008F BODY MASS INDEX DOCD: CPT | Performed by: PHYSICIAN ASSISTANT

## 2024-04-15 NOTE — PROGRESS NOTES
Department of Plastic and Reconstructive Surgery       Initial Office Consultation    Date: 04/15/24  Referring Provider: Ernesto Torres MD  Primary Care Provider: Ernesto Torres MD    Tequila Rodriguez is a 43 y.o. female who was referred by Dr. Torres  for evaluation of upper back and shoulder pain secondary to macromastia.     She presents today with complaints of ongoing and worsening back adb shoulder pain. She notes her pain on a daily basis of 7/10 at its worst and 3/10 at baseline. She endorses pain daily. She states that her has bra strap grooving from her bras that is permanently indenting in her shoulders. She notes her most recent bra size to be 46H. She endorses attempting ice/heat. OTC pain medications including tylenol and ibuprofen, massage, chiropractic care, and is currently in Physical therapy for her back and shoulder pain without resolution or improvement in symptoms. She goes on to explain she additionally get rashes under her breast folds that she has not sought medical attention for and she manages on her own with topical creams. She does not have a history of pregnancy or breast feeding. She is currently attempting weight loss with lifestyle modifications at this time. She was previous seen by a telehealth provider and given ozempic injections however notes that it was too expensive for her to continued.             PMH: HERMILO  Surgical Hx: cholecystectomy  Family Hx: non-contributory  Smoking: none      Review of Systems   All other systems have been reviewed with the patient and have been found to be negative with exception to the chief complaint as mentioned in the history of present illness.    ROS: As noted in history of present illness    - CONSTITUTIONAL: Denies weight loss, fever and chills.  - HEENT: Denies changes in vision and hearing.  - RESPIRATORY: Denies SOB and cough, difficulty breathing  - CV: Denies palpitations and CP  - GI: Denies abdominal pain, nausea,  vomiting and diarrhea.  - : Denies dysuria and urinary frequency.  - MSK:positive for back and shoulder pain.   - SKIN: positive for rashes under the breast folds  - NEUROLOGICAL: Denies headache and syncope.      Objective   Vital Signs:   Vitals:    04/15/24 1101   BP: 138/83   Pulse: 85     Gen: interactive and pleasant  Head: NCAT  Eyes: EOMI, PERRLA  Mouth: MMM  Throat: trachea midline  Cor: RRR  Pulm: nonlabored breathing  Abd: s/nt/nd  Neuro: AAOx3  Ext: extremities perfused    Focused exam of the: breast                                R                  L  SN:NAC             50cm              47cm  NAC:IMF            28cm             26cm         BW                    16cm              16cm        NAC diam         13cm              14cm                                                                                               Ptosis Grade     3               3                                                       Bra Size         46H    Body mass index is 51.84 kg/m².  Body surface area is 2.75 meters squared.       Imaging  No mammogram noted        Assessment/Plan     Viktoria Rodriguez is a 43 y.o. female who was referred by Dr. Torres for evaluation of upper back and shoulder pain secondary to macromastia.     She did undoubtedly has macromastia which is symptomatic causing her pain, bra strap grooving, trapezial hypertrophy.  She is currently in physical therapy without resolution of symptoms. We discussed today her BMI is elevated at 51.84 today. I recommended weight loss as a means of improving back and and optimizing patient for possible need for surgical intervention. We discussed a goal BMI near 35. We will place referral to bariatrics today for weight management.       Plan:   Referral to bariatrics  Continue PT  Continue conservative measures for pain control    I spent 45 minutes with this patient. Greater than 50% of this time was spent in the counselling and/or coordination of care of this  patient.  This note was created using voice recognition software and was not corrected for typographical or grammatical errors.    Signature: Winnie Arguelles PA-C  Date: 04/15/24

## 2024-04-16 ENCOUNTER — TELEPHONE (OUTPATIENT)
Dept: SURGERY | Facility: CLINIC | Age: 44
End: 2024-04-16
Payer: COMMERCIAL

## 2024-04-17 ENCOUNTER — APPOINTMENT (OUTPATIENT)
Dept: PHYSICAL THERAPY | Facility: CLINIC | Age: 44
End: 2024-04-17
Payer: COMMERCIAL

## 2024-04-17 ENCOUNTER — TELEPHONE (OUTPATIENT)
Dept: SURGERY | Facility: CLINIC | Age: 44
End: 2024-04-17
Payer: COMMERCIAL

## 2024-04-19 ENCOUNTER — DOCUMENTATION (OUTPATIENT)
Dept: PHYSICAL THERAPY | Facility: CLINIC | Age: 44
End: 2024-04-19
Payer: COMMERCIAL

## 2024-04-19 NOTE — PROGRESS NOTES
Physical Therapy                 Therapy Communication Note    Patient Name: Viktoria Rodriguez  MRN: [id number]  Today's Date: 4/19/2024     Discipline: Physical Therapy    Missed Visit Reason:      Missed Time: Cancel    Comment:

## 2024-04-24 ENCOUNTER — APPOINTMENT (OUTPATIENT)
Dept: PHYSICAL THERAPY | Facility: CLINIC | Age: 44
End: 2024-04-24
Payer: COMMERCIAL

## 2024-06-06 ENCOUNTER — APPOINTMENT (OUTPATIENT)
Dept: DERMATOLOGY | Facility: CLINIC | Age: 44
End: 2024-06-06
Payer: COMMERCIAL

## 2024-06-12 PROBLEM — E66.01 OBESITY, MORBID, BMI 50 OR HIGHER (MULTI): Status: ACTIVE | Noted: 2020-03-18

## 2024-06-18 ENCOUNTER — HOSPITAL ENCOUNTER (EMERGENCY)
Facility: HOSPITAL | Age: 44
Discharge: HOME | End: 2024-06-18
Payer: COMMERCIAL

## 2024-06-18 VITALS
OXYGEN SATURATION: 98 % | TEMPERATURE: 97.9 F | HEART RATE: 98 BPM | DIASTOLIC BLOOD PRESSURE: 101 MMHG | RESPIRATION RATE: 16 BRPM | SYSTOLIC BLOOD PRESSURE: 158 MMHG

## 2024-06-18 PROCEDURE — 4500999001 HC ED NO CHARGE

## 2024-06-18 ASSESSMENT — COLUMBIA-SUICIDE SEVERITY RATING SCALE - C-SSRS
1. IN THE PAST MONTH, HAVE YOU WISHED YOU WERE DEAD OR WISHED YOU COULD GO TO SLEEP AND NOT WAKE UP?: NO
2. HAVE YOU ACTUALLY HAD ANY THOUGHTS OF KILLING YOURSELF?: NO
6. HAVE YOU EVER DONE ANYTHING, STARTED TO DO ANYTHING, OR PREPARED TO DO ANYTHING TO END YOUR LIFE?: NO

## 2024-06-18 ASSESSMENT — PAIN SCALES - GENERAL: PAINLEVEL_OUTOF10: 7

## 2024-06-18 ASSESSMENT — PAIN - FUNCTIONAL ASSESSMENT: PAIN_FUNCTIONAL_ASSESSMENT: 0-10

## 2024-06-19 NOTE — ED TRIAGE NOTES
Pt stated she came into the ED after burning her right arm and face while cooking. Pt rated the pain 7/10. Pt took four tylenol right after the incident.

## 2024-09-23 ENCOUNTER — DOCUMENTATION (OUTPATIENT)
Dept: PHYSICAL THERAPY | Facility: CLINIC | Age: 44
End: 2024-09-23
Payer: COMMERCIAL

## 2024-09-23 ENCOUNTER — APPOINTMENT (OUTPATIENT)
Dept: PRIMARY CARE | Facility: HOSPITAL | Age: 44
End: 2024-09-23
Payer: COMMERCIAL

## 2024-09-23 NOTE — PROGRESS NOTES
Physical Therapy    Discharge Summary    Name: Viktoria Rodriguez  MRN: [id number]  : 1980  Date: 24    Discharge Summary: PT    Discharge Information: Date of last visit 3/27/24    Therapy Summary: pt did not follow up    Discharge Status: discharge to [facility]      Rehab Discharge Reason: Failed to schedule and/or keep follow-up appointment(s)

## 2025-02-04 ENCOUNTER — APPOINTMENT (OUTPATIENT)
Dept: PRIMARY CARE | Facility: CLINIC | Age: 45
End: 2025-02-04
Payer: COMMERCIAL

## 2025-02-04 ENCOUNTER — TELEPHONE (OUTPATIENT)
Dept: PRIMARY CARE | Facility: CLINIC | Age: 45
End: 2025-02-04

## 2025-04-01 ENCOUNTER — APPOINTMENT (OUTPATIENT)
Dept: PRIMARY CARE | Facility: CLINIC | Age: 45
End: 2025-04-01
Payer: COMMERCIAL

## 2025-04-01 VITALS
BODY MASS INDEX: 43.4 KG/M2 | DIASTOLIC BLOOD PRESSURE: 82 MMHG | HEART RATE: 95 BPM | HEIGHT: 69 IN | TEMPERATURE: 98 F | WEIGHT: 293 LBS | SYSTOLIC BLOOD PRESSURE: 138 MMHG | OXYGEN SATURATION: 94 %

## 2025-04-01 DIAGNOSIS — M54.41 CHRONIC RIGHT-SIDED LOW BACK PAIN WITH BILATERAL SCIATICA: ICD-10-CM

## 2025-04-01 DIAGNOSIS — E66.01 OBESITY, MORBID, BMI 50 OR HIGHER (MULTI): ICD-10-CM

## 2025-04-01 DIAGNOSIS — G89.29 CHRONIC RIGHT-SIDED LOW BACK PAIN WITH BILATERAL SCIATICA: ICD-10-CM

## 2025-04-01 DIAGNOSIS — M54.42 CHRONIC RIGHT-SIDED LOW BACK PAIN WITH BILATERAL SCIATICA: ICD-10-CM

## 2025-04-01 DIAGNOSIS — Z00.00 HEALTHCARE MAINTENANCE: Primary | ICD-10-CM

## 2025-04-01 DIAGNOSIS — E55.9 HYPOVITAMINOSIS D: ICD-10-CM

## 2025-04-01 DIAGNOSIS — Z12.31 BREAST CANCER SCREENING BY MAMMOGRAM: ICD-10-CM

## 2025-04-01 DIAGNOSIS — K21.00 GASTROESOPHAGEAL REFLUX DISEASE WITH ESOPHAGITIS WITHOUT HEMORRHAGE: ICD-10-CM

## 2025-04-01 PROCEDURE — 99214 OFFICE O/P EST MOD 30 MIN: CPT | Performed by: INTERNAL MEDICINE

## 2025-04-01 PROCEDURE — 3008F BODY MASS INDEX DOCD: CPT | Performed by: INTERNAL MEDICINE

## 2025-04-01 PROCEDURE — 1036F TOBACCO NON-USER: CPT | Performed by: INTERNAL MEDICINE

## 2025-04-01 PROCEDURE — 99396 PREV VISIT EST AGE 40-64: CPT | Performed by: INTERNAL MEDICINE

## 2025-04-01 RX ORDER — FAMOTIDINE 20 MG/1
20 TABLET, FILM COATED ORAL NIGHTLY PRN
Qty: 45 TABLET | Refills: 3 | Status: SHIPPED | OUTPATIENT
Start: 2025-04-01

## 2025-04-01 RX ORDER — TIZANIDINE 4 MG/1
4 TABLET ORAL DAILY PRN
COMMUNITY
Start: 2024-08-21 | End: 2025-04-01 | Stop reason: SDUPTHER

## 2025-04-01 RX ORDER — TIZANIDINE 4 MG/1
4 TABLET ORAL EVERY 8 HOURS PRN
Qty: 30 TABLET | Refills: 3 | Status: SHIPPED | OUTPATIENT
Start: 2025-04-01 | End: 2026-04-01

## 2025-04-01 RX ORDER — FAMOTIDINE 20 MG/1
TABLET, FILM COATED ORAL
COMMUNITY
Start: 2022-09-01 | End: 2025-04-01 | Stop reason: SDUPTHER

## 2025-04-01 RX ORDER — ACETAMINOPHEN 325 MG/1
TABLET ORAL
COMMUNITY
Start: 2024-08-18

## 2025-04-01 ASSESSMENT — ENCOUNTER SYMPTOMS
WEAKNESS: 0
DEPRESSION: 0
CHEST TIGHTNESS: 0
BACK PAIN: 1
SHORTNESS OF BREATH: 0
CHILLS: 0
MYALGIAS: 0
PALPITATIONS: 0
DIARRHEA: 0
NAUSEA: 0
AGITATION: 0
ACTIVITY CHANGE: 0

## 2025-04-01 ASSESSMENT — PATIENT HEALTH QUESTIONNAIRE - PHQ9
SUM OF ALL RESPONSES TO PHQ9 QUESTIONS 1 AND 2: 0
1. LITTLE INTEREST OR PLEASURE IN DOING THINGS: NOT AT ALL
2. FEELING DOWN, DEPRESSED OR HOPELESS: NOT AT ALL

## 2025-04-01 ASSESSMENT — PAIN SCALES - GENERAL: PAINLEVEL_OUTOF10: 4

## 2025-04-01 NOTE — PROGRESS NOTES
"Subjective   Patient ID: Viktoria Rodriguez is a 44 y.o. female who presents for Annual Exam. Doing well.  Still worried about her weight.  She didn't lose weight.  She otherwise feels fairly well except for periodically has back pain that requires muscle relaxants.  Her previous doctor would give her those periodically and they helped.  She did not abuse them and has no history of abuse.    HPI     Review of Systems   Constitutional:  Negative for activity change and chills.   HENT:  Negative for congestion.    Respiratory:  Negative for chest tightness and shortness of breath.    Cardiovascular:  Negative for chest pain and palpitations.   Gastrointestinal:  Negative for diarrhea and nausea.   Musculoskeletal:  Positive for back pain. Negative for myalgias.   Neurological:  Negative for weakness.   Psychiatric/Behavioral:  Negative for agitation and behavioral problems.        Objective   /82 (BP Location: Left arm, Patient Position: Sitting, BP Cuff Size: Large adult)   Pulse 95   Temp 36.7 °C (98 °F) (Temporal)   Ht 1.74 m (5' 8.5\")   Wt (!) 157 kg (347 lb)   SpO2 94%   BMI 51.99 kg/m²     Physical Exam  Constitutional:       Appearance: Normal appearance.   HENT:      Head: Normocephalic and atraumatic.      Nose: Nose normal.      Mouth/Throat:      Mouth: Mucous membranes are moist.   Eyes:      Extraocular Movements: Extraocular movements intact.   Cardiovascular:      Rate and Rhythm: Normal rate and regular rhythm.   Pulmonary:      Effort: No respiratory distress.      Breath sounds: No wheezing.   Abdominal:      General: Bowel sounds are normal.      Palpations: Abdomen is soft.   Neurological:      General: No focal deficit present.       Assessment/Plan   Assessment & Plan  Healthcare maintenance  We take better care of herself  Orders:    Comprehensive Metabolic Panel; Future    Chronic right-sided low back pain with bilateral sciatica  Requesting a refill of her muscle relaxant.  We talked " about exercise planning as well.  Orders:    tiZANidine (Zanaflex) 4 mg tablet; Take 1 tablet (4 mg) by mouth every 8 hours if needed for muscle spasms.    Breast cancer screening by mammogram    Orders:    BI mammo bilateral screening tomosynthesis; Future    Gastroesophageal reflux disease with esophagitis without hemorrhage  Still needing acid blockers.   Orders:    famotidine (Pepcid) 20 mg tablet; Take 1 tablet (20 mg) by mouth as needed at bedtime for heartburn.    Hypovitaminosis D  We have a plan for this.        Obesity, morbid, BMI 50 or higher (Multi)  She will investigate.   Orders:    Hemoglobin A1C; Future    Lipid Panel Non-Fasting; Future

## 2025-04-01 NOTE — ASSESSMENT & PLAN NOTE
Requesting a refill of her muscle relaxant.  We talked about exercise planning as well.  Orders:    tiZANidine (Zanaflex) 4 mg tablet; Take 1 tablet (4 mg) by mouth every 8 hours if needed for muscle spasms.

## 2025-04-01 NOTE — ASSESSMENT & PLAN NOTE
Still needing acid blockers.   Orders:    famotidine (Pepcid) 20 mg tablet; Take 1 tablet (20 mg) by mouth as needed at bedtime for heartburn.

## 2025-04-02 LAB
ALBUMIN SERPL-MCNC: 3.9 G/DL (ref 3.6–5.1)
ALP SERPL-CCNC: 71 U/L (ref 31–125)
ALT SERPL-CCNC: 9 U/L (ref 6–29)
ANION GAP SERPL CALCULATED.4IONS-SCNC: 10 MMOL/L (CALC) (ref 7–17)
AST SERPL-CCNC: 13 U/L (ref 10–30)
BILIRUB SERPL-MCNC: 0.4 MG/DL (ref 0.2–1.2)
BUN SERPL-MCNC: 10 MG/DL (ref 7–25)
CALCIUM SERPL-MCNC: 8.8 MG/DL (ref 8.6–10.2)
CHLORIDE SERPL-SCNC: 104 MMOL/L (ref 98–110)
CHOLEST SERPL-MCNC: 145 MG/DL
CHOLEST/HDLC SERPL: 2.3 (CALC)
CO2 SERPL-SCNC: 24 MMOL/L (ref 20–32)
CREAT SERPL-MCNC: 0.76 MG/DL (ref 0.5–0.99)
EGFRCR SERPLBLD CKD-EPI 2021: 100 ML/MIN/1.73M2
EST. AVERAGE GLUCOSE BLD GHB EST-MCNC: 140 MG/DL
EST. AVERAGE GLUCOSE BLD GHB EST-SCNC: 7.7 MMOL/L
GLUCOSE SERPL-MCNC: 152 MG/DL (ref 65–99)
HBA1C MFR BLD: 6.5 % OF TOTAL HGB
HDLC SERPL-MCNC: 63 MG/DL
LDLC SERPL CALC-MCNC: 65 MG/DL (CALC)
NONHDLC SERPL-MCNC: 82 MG/DL (CALC)
POTASSIUM SERPL-SCNC: 4.3 MMOL/L (ref 3.5–5.3)
PROT SERPL-MCNC: 7.3 G/DL (ref 6.1–8.1)
SODIUM SERPL-SCNC: 138 MMOL/L (ref 135–146)
TRIGL SERPL-MCNC: 90 MG/DL

## 2025-04-11 ENCOUNTER — TELEMEDICINE (OUTPATIENT)
Dept: PRIMARY CARE | Facility: CLINIC | Age: 45
End: 2025-04-11
Payer: COMMERCIAL

## 2025-04-11 DIAGNOSIS — R73.03 PREDIABETES: Primary | ICD-10-CM

## 2025-04-11 PROBLEM — R05.9 COUGH: Status: RESOLVED | Noted: 2024-02-28 | Resolved: 2025-04-11

## 2025-04-11 PROBLEM — R50.9 FEVER: Status: RESOLVED | Noted: 2024-02-28 | Resolved: 2025-04-11

## 2025-04-11 PROBLEM — D50.0 ANEMIA DUE TO BLOOD LOSS: Status: RESOLVED | Noted: 2024-02-28 | Resolved: 2025-04-11

## 2025-04-11 PROBLEM — D50.9 IRON DEFICIENCY ANEMIA: Status: RESOLVED | Noted: 2020-03-19 | Resolved: 2025-04-11

## 2025-04-11 PROBLEM — R11.2 NAUSEA & VOMITING: Status: RESOLVED | Noted: 2024-02-28 | Resolved: 2025-04-11

## 2025-04-11 PROBLEM — R10.9 ABDOMINAL DISCOMFORT: Status: RESOLVED | Noted: 2024-02-28 | Resolved: 2025-04-11

## 2025-04-11 PROCEDURE — 99214 OFFICE O/P EST MOD 30 MIN: CPT | Performed by: NURSE PRACTITIONER

## 2025-04-11 PROCEDURE — 1036F TOBACCO NON-USER: CPT | Performed by: NURSE PRACTITIONER

## 2025-04-11 NOTE — PATIENT INSTRUCTIONS
- eat off the smaller plate (like the salad plate)  - half the plate should be vegetables, 1/4 protein, 1/4 carbs - for lunch and dinner  -  discussed dietary changes including proper protein intake, increase vegetable intake, fruit intake, low carbohydrate intake, and no processed food intake.  - if you eat a carb - make sure to have a protein  - discussed meal prepping    put your fork down between bites  - drink at least 64 oz of water day.  do not consume large amounts of water with your meal  - do not drink sugary drinks such as pop or specialty coffee drinks  -  eat your vegetables and protein first.  Have vegetables at lunch and dinner  - discussed with patient to increase activity 4 days a week preferably 5. Exercise should be done 5 days a week including walking for 20-30 minutes each day. Work up to this goal.  If you have not been exercising then start with a 10-15 minute walk most days of the week -  keep log of calorie intake and food intake and bring with you to next appointment.You can use an carla on your phone such as my fitness pal.  - discussed with patient using activity trackers such as a fit bit. log  activity daily and bring  activity log at next visit  - increase your protein intake - should have at least 6-9 servings of protein per day  - decrease carb intake - discussed carb serving size and to read packages  - limit carb servings to 4 servings a day  -1500-calorie meal plan get patient and reviewed

## 2025-04-11 NOTE — ASSESSMENT & PLAN NOTE
Reviewed A1c of 6.5%  Reviewed nutritional changes to start making today to help with her sugar  Discussed making appointment with nutritionist  Gave a copy of the 1500-calorie meal plan and reviewed with her  Reviewed to start exercise and to start slow.  We discussed starting at 10 minutes and doing this every day or every other day x 1 month and then increasing to 15 minutes.  We discussed how to incorporate weights and not just cardio  Reviewed with her to follow-up with PCP in 3 months to have A1c rechecked

## 2025-04-11 NOTE — PROGRESS NOTES
Subjective   Patient ID: Viktoria Rodriguez is a 44 y.o. female who presents for Results.    Virtual or Telephone Consent    An interactive audio and video telecommunication system which permits real time communications between the patient (at the originating site) and provider (at the distant site) was utilized to provide this telehealth service.   Verbal consent was requested and obtained from Viktoria Rodriguez on this date, 04/11/25 for a telehealth visit and the patient's location was confirmed at the time of the visit.  Patient is located in [place]    Since January has had 2 episodes of dizziness when she has not eaten Yesterday and in January.  Felt better after having some juice and a snack   She did follow-up with her PCP April 1 and did have labs completed.  She was noted to have A1c of 6.5.  She does want to get this addressed of how to help decrease her sugar and not start diabetic medication.  Yesterday when she had forgotten to eat she was cleaning a room and changing it over to a home gym so that she can start exercising.  She states that she will be making an appointment with a nutritionist             Review of Systems   All other systems reviewed and are negative.      Objective   There were no vitals taken for this visit.    Physical Exam  Constitutional:       General: She is not in acute distress.     Appearance: Normal appearance.   HENT:      Head: Normocephalic and atraumatic.      Right Ear: External ear normal.      Left Ear: External ear normal.      Nose: Nose normal.      Mouth/Throat:      Mouth: Mucous membranes are moist.   Eyes:      Extraocular Movements: Extraocular movements intact.      Conjunctiva/sclera: Conjunctivae normal.      Pupils: Pupils are equal, round, and reactive to light.   Pulmonary:      Effort: Pulmonary effort is normal.   Neurological:      Mental Status: She is alert and oriented to person, place, and time.   Psychiatric:         Mood and Affect: Mood normal.          Behavior: Behavior normal.         Thought Content: Thought content normal.         Judgment: Judgment normal.         Assessment/Plan   Problem List Items Addressed This Visit             ICD-10-CM    Prediabetes - Primary R73.03     Reviewed A1c of 6.5%  Reviewed nutritional changes to start making today to help with her sugar  Discussed making appointment with nutritionist  Gave a copy of the 1500-calorie meal plan and reviewed with her  Reviewed to start exercise and to start slow.  We discussed starting at 10 minutes and doing this every day or every other day x 1 month and then increasing to 15 minutes.  We discussed how to incorporate weights and not just cardio  Reviewed with her to follow-up with PCP in 3 months to have A1c rechecked

## 2025-04-22 ENCOUNTER — APPOINTMENT (OUTPATIENT)
Dept: RADIOLOGY | Facility: HOSPITAL | Age: 45
End: 2025-04-22
Payer: COMMERCIAL

## 2025-04-24 ENCOUNTER — HOSPITAL ENCOUNTER (OUTPATIENT)
Dept: RADIOLOGY | Facility: HOSPITAL | Age: 45
Discharge: HOME | End: 2025-04-24
Payer: COMMERCIAL

## 2025-04-24 DIAGNOSIS — Z12.31 BREAST CANCER SCREENING BY MAMMOGRAM: ICD-10-CM

## 2025-04-24 PROCEDURE — 77067 SCR MAMMO BI INCL CAD: CPT | Performed by: RADIOLOGY

## 2025-04-24 PROCEDURE — 77063 BREAST TOMOSYNTHESIS BI: CPT | Performed by: RADIOLOGY

## 2025-04-24 PROCEDURE — 77063 BREAST TOMOSYNTHESIS BI: CPT

## 2025-06-17 DIAGNOSIS — M54.42 CHRONIC RIGHT-SIDED LOW BACK PAIN WITH BILATERAL SCIATICA: ICD-10-CM

## 2025-06-17 DIAGNOSIS — M54.41 CHRONIC RIGHT-SIDED LOW BACK PAIN WITH BILATERAL SCIATICA: ICD-10-CM

## 2025-06-17 DIAGNOSIS — G89.29 CHRONIC RIGHT-SIDED LOW BACK PAIN WITH BILATERAL SCIATICA: ICD-10-CM

## 2025-06-18 RX ORDER — TIZANIDINE 4 MG/1
4 TABLET ORAL NIGHTLY PRN
Qty: 30 TABLET | Refills: 3 | Status: SHIPPED | OUTPATIENT
Start: 2025-06-18

## 2025-07-07 DIAGNOSIS — K21.00 GASTROESOPHAGEAL REFLUX DISEASE WITH ESOPHAGITIS WITHOUT HEMORRHAGE: ICD-10-CM

## 2025-07-07 RX ORDER — FAMOTIDINE 20 MG/1
20 TABLET, FILM COATED ORAL NIGHTLY PRN
Qty: 90 TABLET | Refills: 3 | Status: SHIPPED | OUTPATIENT
Start: 2025-07-07

## 2025-07-10 ENCOUNTER — APPOINTMENT (OUTPATIENT)
Dept: PRIMARY CARE | Facility: CLINIC | Age: 45
End: 2025-07-10
Payer: COMMERCIAL

## 2025-07-17 ENCOUNTER — APPOINTMENT (OUTPATIENT)
Dept: PRIMARY CARE | Facility: CLINIC | Age: 45
End: 2025-07-17
Payer: COMMERCIAL

## 2025-07-22 ENCOUNTER — APPOINTMENT (OUTPATIENT)
Dept: PRIMARY CARE | Facility: CLINIC | Age: 45
End: 2025-07-22
Payer: COMMERCIAL

## 2025-07-25 ENCOUNTER — HOSPITAL ENCOUNTER (EMERGENCY)
Facility: HOSPITAL | Age: 45
Discharge: HOME | End: 2025-07-25
Payer: COMMERCIAL

## 2025-07-25 ENCOUNTER — APPOINTMENT (OUTPATIENT)
Dept: RADIOLOGY | Facility: HOSPITAL | Age: 45
End: 2025-07-25
Payer: COMMERCIAL

## 2025-07-25 VITALS
BODY MASS INDEX: 44.41 KG/M2 | DIASTOLIC BLOOD PRESSURE: 89 MMHG | HEIGHT: 68 IN | OXYGEN SATURATION: 99 % | HEART RATE: 90 BPM | WEIGHT: 293 LBS | SYSTOLIC BLOOD PRESSURE: 192 MMHG | TEMPERATURE: 97.3 F | RESPIRATION RATE: 18 BRPM

## 2025-07-25 DIAGNOSIS — M46.1 SI (SACROILIAC) JOINT INFLAMMATION: Primary | ICD-10-CM

## 2025-07-25 DIAGNOSIS — M54.16 LUMBAR RADICULOPATHY: ICD-10-CM

## 2025-07-25 PROCEDURE — 2500000004 HC RX 250 GENERAL PHARMACY W/ HCPCS (ALT 636 FOR OP/ED): Performed by: PHYSICIAN ASSISTANT

## 2025-07-25 PROCEDURE — 99284 EMERGENCY DEPT VISIT MOD MDM: CPT | Mod: 25

## 2025-07-25 PROCEDURE — 93971 EXTREMITY STUDY: CPT | Mod: FOREIGN READ | Performed by: RADIOLOGY

## 2025-07-25 PROCEDURE — 93971 EXTREMITY STUDY: CPT

## 2025-07-25 PROCEDURE — 2500000001 HC RX 250 WO HCPCS SELF ADMINISTERED DRUGS (ALT 637 FOR MEDICARE OP): Performed by: PHYSICIAN ASSISTANT

## 2025-07-25 RX ORDER — METHYLPREDNISOLONE 4 MG/1
TABLET ORAL
Qty: 21 TABLET | Refills: 0 | Status: SHIPPED | OUTPATIENT
Start: 2025-07-25 | End: 2025-08-01

## 2025-07-25 RX ORDER — PREDNISONE 20 MG/1
60 TABLET ORAL ONCE
Status: COMPLETED | OUTPATIENT
Start: 2025-07-25 | End: 2025-07-25

## 2025-07-25 RX ORDER — IBUPROFEN 600 MG/1
600 TABLET, FILM COATED ORAL ONCE
Status: COMPLETED | OUTPATIENT
Start: 2025-07-25 | End: 2025-07-25

## 2025-07-25 RX ORDER — ACETAMINOPHEN 325 MG/1
650 TABLET ORAL EVERY 6 HOURS PRN
Qty: 30 TABLET | Refills: 0 | Status: SHIPPED | OUTPATIENT
Start: 2025-07-25 | End: 2025-08-04

## 2025-07-25 RX ORDER — LIDOCAINE 50 MG/G
1 PATCH TOPICAL DAILY
Qty: 10 PATCH | Refills: 0 | Status: SHIPPED | OUTPATIENT
Start: 2025-07-25

## 2025-07-25 RX ORDER — NAPROXEN 500 MG/1
500 TABLET ORAL
Qty: 14 TABLET | Refills: 0 | Status: SHIPPED | OUTPATIENT
Start: 2025-07-25 | End: 2025-08-01

## 2025-07-25 RX ADMIN — PREDNISONE 60 MG: 20 TABLET ORAL at 18:55

## 2025-07-25 RX ADMIN — IBUPROFEN 600 MG: 600 TABLET ORAL at 18:55

## 2025-07-25 ASSESSMENT — PAIN DESCRIPTION - PROGRESSION: CLINICAL_PROGRESSION: NOT CHANGED

## 2025-07-25 ASSESSMENT — PAIN SCALES - GENERAL: PAINLEVEL_OUTOF10: 7

## 2025-07-25 ASSESSMENT — PAIN - FUNCTIONAL ASSESSMENT: PAIN_FUNCTIONAL_ASSESSMENT: 0-10

## 2025-07-25 ASSESSMENT — PAIN DESCRIPTION - ORIENTATION: ORIENTATION: RIGHT

## 2025-07-25 ASSESSMENT — PAIN DESCRIPTION - DESCRIPTORS
DESCRIPTORS: THROBBING
DESCRIPTORS: THROBBING

## 2025-07-25 ASSESSMENT — LIFESTYLE VARIABLES
HAVE YOU EVER FELT YOU SHOULD CUT DOWN ON YOUR DRINKING: NO
EVER HAD A DRINK FIRST THING IN THE MORNING TO STEADY YOUR NERVES TO GET RID OF A HANGOVER: NO
EVER FELT BAD OR GUILTY ABOUT YOUR DRINKING: NO
HAVE PEOPLE ANNOYED YOU BY CRITICIZING YOUR DRINKING: NO
TOTAL SCORE: 0

## 2025-07-25 ASSESSMENT — PAIN DESCRIPTION - LOCATION: LOCATION: LEG

## 2025-07-25 ASSESSMENT — PAIN DESCRIPTION - PAIN TYPE: TYPE: ACUTE PAIN

## 2025-07-25 NOTE — ED TRIAGE NOTES
Pt ambulatory to triage for right sided calf pain that is radiating up her leg. Pt sts its been hurting for about a month. Pt alert and oriented x4

## 2025-07-25 NOTE — Clinical Note
Viktoria Rodriguez was seen and treated in our emergency department on 7/25/2025.  She may return to work on 07/30/2025.       If you have any questions or concerns, please don't hesitate to call.      Jeancarlos Dumont PA-C

## 2025-07-26 NOTE — ED PROVIDER NOTES
HPI   Chief Complaint   Patient presents with    Leg Pain     Right sided calf pain        44-year-old female presented emergency department with a chief complaint of pain in her right leg and calf region.  Has been ongoing for the last several weeks.  Has not really been taking much for relief.  Denies any specific injury or trauma.  Does sit for long periods during her job.  No history of PE.  No other complaint              Patient History   Medical History[1]  Surgical History[2]  Family History[3]  Social History[4]    Physical Exam   ED Triage Vitals [07/25/25 1531]   Temperature Heart Rate Respirations BP   36.3 °C (97.3 °F) 90 18 (!) 192/89      Pulse Ox Temp Source Heart Rate Source Patient Position   99 % Tympanic Monitor Sitting      BP Location FiO2 (%)     Right arm --       Physical Exam  Vitals reviewed.   Constitutional:       Appearance: Normal appearance.   HENT:      Head: Normocephalic.      Nose: Nose normal.      Mouth/Throat:      Mouth: Mucous membranes are moist.     Cardiovascular:      Rate and Rhythm: Normal rate and regular rhythm.      Pulses: Normal pulses.   Pulmonary:      Effort: Pulmonary effort is normal.      Breath sounds: Normal breath sounds.   Abdominal:      General: Abdomen is flat.     Musculoskeletal:         General: Normal range of motion.      Cervical back: Normal range of motion.     Skin:     General: Skin is warm.     Neurological:      General: No focal deficit present.      Mental Status: She is alert and oriented to person, place, and time.     Psychiatric:         Mood and Affect: Mood normal.         Behavior: Behavior normal.           ED Course & MDM   Diagnoses as of 07/26/25 1355   SI (sacroiliac) joint inflammation   Lumbar radiculopathy                 No data recorded     [place] Coma Scale Score: 15 (07/25/25 1532 : Sara Martinez RN)                           Medical Decision Making  I have seen and evaluated this patient.  Physician available for  consultation.  Vital signs have been reviewed.  All laboratory and diagnostic imaging is reviewed by myself and interpreted by myself unless otherwise stated.  Additionally imaging is interpreted by radiologist.    ultraSound negative, most consistent with sciatica, musculoskeletal etiology.  No DVT.  Placed on symptomatic medication with outpatient follow-up and physical therapy referral.    Labs Reviewed - No data to display  Lower extremity venous duplex right   Final Result    No evidence for DVT within the right lower extremity.    Signed by Danny Reilly MD     Medications  predniSONE (Deltasone) tablet 60 mg (60 mg oral Given 7/25/25 1855)  ibuprofen tablet 600 mg (600 mg oral Given 7/25/25 1855)  Discharge Medication List as of 7/25/2025  6:51 PM    START taking these medications    !! acetaminophen (Tylenol) 325 mg tablet  Take 2 tablets (650 mg) by mouth every 6 hours if needed for mild pain (1 - 3) for up to 10 days., Starting Fri 7/25/2025, Until Mon 8/4/2025 at 2359, Normal    lidocaine (Lidoderm) 5 % patch  Place 1 patch over 12 hours on the skin once daily. Remove & discard patch within 12 hours or as directed by MD., Starting Fri 7/25/2025, Normal    methylPREDNISolone (Medrol Dospak) 4 mg tablets  Follow schedule on package instructions, Normal    naproxen (Naprosyn) 500 mg tablet  Take 1 tablet (500 mg) by mouth 2 times daily (morning and late afternoon) for 7 days., Starting Fri 7/25/2025, Until Fri 8/1/2025, Normal    !! - Potential duplicate medications found. Please discuss with provider.                      Procedure  Procedures       [1]   Past Medical History:  Diagnosis Date    Abdominal discomfort 02/28/2024    Anemia due to blood loss 02/28/2024    Cough 02/28/2024    Fever 02/28/2024    Iron deficiency anemia 03/19/2020    Nausea & vomiting 02/28/2024    Visual impairment 12/23/1985   [2]   Past Surgical History:  Procedure Laterality Date    CHOLECYSTECTOMY  03/05/2023   [3]   Family  History  Problem Relation Name Age of Onset    Heart disease Paternal Grandfather      Kidney disease Mother Adria Short    [4]   Social History  Tobacco Use    Smoking status: Never    Smokeless tobacco: Never   Vaping Use    Vaping status: Never Used   Substance Use Topics    Alcohol use: Yes     Alcohol/week: 4.0 standard drinks of alcohol     Types: 4 Glasses of wine per week    Drug use: Yes     Types: Marijuana        Jeancarlos Dumont PA-C  07/26/25 3678

## 2025-08-26 DIAGNOSIS — M54.41 CHRONIC RIGHT-SIDED LOW BACK PAIN WITH BILATERAL SCIATICA: ICD-10-CM

## 2025-08-26 DIAGNOSIS — M54.42 CHRONIC RIGHT-SIDED LOW BACK PAIN WITH BILATERAL SCIATICA: ICD-10-CM

## 2025-08-26 DIAGNOSIS — G89.29 CHRONIC RIGHT-SIDED LOW BACK PAIN WITH BILATERAL SCIATICA: ICD-10-CM

## 2025-08-26 RX ORDER — TIZANIDINE 4 MG/1
4 TABLET ORAL NIGHTLY PRN
Qty: 30 TABLET | Refills: 9 | Status: SHIPPED | OUTPATIENT
Start: 2025-08-26 | End: 2025-08-27

## 2025-08-27 DIAGNOSIS — G89.29 CHRONIC RIGHT-SIDED LOW BACK PAIN WITH BILATERAL SCIATICA: ICD-10-CM

## 2025-08-27 DIAGNOSIS — M54.41 CHRONIC RIGHT-SIDED LOW BACK PAIN WITH BILATERAL SCIATICA: ICD-10-CM

## 2025-08-27 DIAGNOSIS — M54.42 CHRONIC RIGHT-SIDED LOW BACK PAIN WITH BILATERAL SCIATICA: ICD-10-CM

## 2025-08-28 RX ORDER — TIZANIDINE 4 MG/1
4 TABLET ORAL NIGHTLY PRN
Qty: 90 TABLET | Refills: 2 | Status: SHIPPED | OUTPATIENT
Start: 2025-08-28

## 2026-04-02 ENCOUNTER — APPOINTMENT (OUTPATIENT)
Dept: PRIMARY CARE | Facility: CLINIC | Age: 46
End: 2026-04-02
Payer: COMMERCIAL